# Patient Record
Sex: MALE | Race: WHITE
[De-identification: names, ages, dates, MRNs, and addresses within clinical notes are randomized per-mention and may not be internally consistent; named-entity substitution may affect disease eponyms.]

---

## 2019-07-28 ENCOUNTER — HOSPITAL ENCOUNTER (EMERGENCY)
Dept: HOSPITAL 25 - ED | Age: 24
Discharge: HOME | End: 2019-07-28
Payer: COMMERCIAL

## 2019-07-28 VITALS — SYSTOLIC BLOOD PRESSURE: 121 MMHG | DIASTOLIC BLOOD PRESSURE: 77 MMHG

## 2019-07-28 DIAGNOSIS — B27.90: Primary | ICD-10-CM

## 2019-07-28 DIAGNOSIS — J45.909: ICD-10-CM

## 2019-07-28 DIAGNOSIS — Z88.0: ICD-10-CM

## 2019-07-28 DIAGNOSIS — F17.290: ICD-10-CM

## 2019-07-28 LAB
ALBUMIN SERPL BCG-MCNC: 3.8 G/DL (ref 3.2–5.2)
ALBUMIN/GLOB SERPL: 1.2 {RATIO} (ref 1–3)
ALP SERPL-CCNC: 122 U/L (ref 34–104)
ALT SERPL W P-5'-P-CCNC: 147 U/L (ref 7–52)
ANION GAP SERPL CALC-SCNC: 6 MMOL/L (ref 2–11)
APTT PPP: 36.5 SECONDS (ref 26–38)
AST SERPL-CCNC: 134 U/L (ref 13–39)
BASOPHILS # BLD AUTO: 0 10^3/UL (ref 0–0.2)
BUN SERPL-MCNC: 10 MG/DL (ref 6–24)
BUN/CREAT SERPL: 9.7 (ref 8–20)
CALCIUM SERPL-MCNC: 9.2 MG/DL (ref 8.6–10.3)
CHLORIDE SERPL-SCNC: 100 MMOL/L (ref 101–111)
EOSINOPHIL # BLD AUTO: 0.1 10^3/UL (ref 0–0.6)
GLOBULIN SER CALC-MCNC: 3.1 G/DL (ref 2–4)
GLUCOSE SERPL-MCNC: 91 MG/DL (ref 70–100)
HCO3 SERPL-SCNC: 29 MMOL/L (ref 22–32)
HCT VFR BLD AUTO: 47 % (ref 42–52)
HGB BLD-MCNC: 16 G/DL (ref 14–18)
INR PPP/BLD: 1.12 (ref 0.82–1.09)
LYMPHOCYTES # BLD AUTO: 4.6 10^3/UL (ref 1–4.8)
MCH RBC QN AUTO: 30 PG (ref 27–31)
MCHC RBC AUTO-ENTMCNC: 34 G/DL (ref 31–36)
MCV RBC AUTO: 89 FL (ref 80–94)
MONOCYTES # BLD AUTO: 0.9 10^3/UL (ref 0–0.8)
NEUTROPHILS # BLD AUTO: 1.4 10^3/UL (ref 1.5–7.7)
NRBC # BLD AUTO: 0 10^3/UL
NRBC BLD QL AUTO: 0.3
PLATELET # BLD AUTO: 124 10^3/UL (ref 150–450)
POTASSIUM SERPL-SCNC: 4.2 MMOL/L (ref 3.5–5)
PROT SERPL-MCNC: 6.9 G/DL (ref 6.4–8.9)
RBC # BLD AUTO: 5.25 10^6 /UL (ref 4.18–5.48)
SODIUM SERPL-SCNC: 135 MMOL/L (ref 135–145)
VARIANT LYMPHS # BLD MANUAL: 49 % (ref 0–6)
WBC # BLD AUTO: 6.9 10^3/UL (ref 3.5–10.8)

## 2019-07-28 PROCEDURE — 85610 PROTHROMBIN TIME: CPT

## 2019-07-28 PROCEDURE — 85730 THROMBOPLASTIN TIME PARTIAL: CPT

## 2019-07-28 PROCEDURE — 99282 EMERGENCY DEPT VISIT SF MDM: CPT

## 2019-07-28 PROCEDURE — 83605 ASSAY OF LACTIC ACID: CPT

## 2019-07-28 PROCEDURE — 85025 COMPLETE CBC W/AUTO DIFF WBC: CPT

## 2019-07-28 PROCEDURE — 80053 COMPREHEN METABOLIC PANEL: CPT

## 2019-07-28 PROCEDURE — 86308 HETEROPHILE ANTIBODY SCREEN: CPT

## 2019-07-28 PROCEDURE — 36415 COLL VENOUS BLD VENIPUNCTURE: CPT

## 2019-07-28 PROCEDURE — 87040 BLOOD CULTURE FOR BACTERIA: CPT

## 2019-07-28 PROCEDURE — 85060 BLOOD SMEAR INTERPRETATION: CPT

## 2019-07-28 PROCEDURE — 71046 X-RAY EXAM CHEST 2 VIEWS: CPT

## 2019-07-28 NOTE — ED
HPI Febrile Illness





- HPI Summary


HPI Summary: 


This patient is a 24 year old male presenting to [facility] with a chief complaint 

of fever since 5 days ago. He reports rash, n/v, chills, and diaphoresis. He 

says the rash has been moving around to different body parts. The patient 

denies ear pain ,throat pain, and diarrhea. The patient states he has had 4-5 

tick bites over the last year but states a test for lyme came back negative 

yesterday. He states the rash is not puritic. 





- History of Current Complaint


Chief Complaint: EDFever


Time Seen by Provider: 07/28/19 11:07


Hx Obtained From: Patient


Onset/Duration: Started Days Ago


Pain Intensity: 0


Pain Scale Used: 0-10 Numeric


Associated Signs and Symptoms: Diaphoresis





- Allergy/Home Medications


Allergies/Adverse Reactions: 


 Allergies











Allergy/AdvReac Type Severity Reaction Status Date / Time


 


MS Penicillin V Allergy Intermediate Rash Verified 12/02/15 12:40





[From Penicil VK]     














PMH/Surg Hx/FS Hx/Imm Hx


Respiratory History: Reports: Hx Asthma - seasonl





- Surgical History


Surgery Procedure, Year, and Place: t & a, ear tubes as a child


Infectious Disease History: No


Infectious Disease History: 


   Denies: Hx Clostridium Difficile, Hx Hepatitis, Hx Human Immunodeficiency 

Virus (HIV), Hx of Known/Suspected MRSA, Hx Tuberculosis, Hx Known/Suspected VRE

, Hx Known/Suspected VRSA, History Other Infectious Disease, Traveled Outside 

the  in Last 30 Days





- Social History


Alcohol Use: Rare


Substance Use Type: Reports: None


Smoking Status (MU): Never Smoked Tobacco


Amount Used/How Often: daily/used daily


Have You Smoked in the Last Year:  - pt chew tobacco





Review of Systems


Positive: Fever, Chills, Skin Diaphoresis


Negative: Sore Throat, Ear Ache


Positive: Vomiting, Nausea


Positive: Rash


All Other Systems Reviewed And Are Negative: Yes





Physical Exam





- Summary


Physical Exam Summary: 





Appearance: Well appearing, no pain distress


Skin: warm, dry, reflects adequate perfusion. Rash papular erythmatous on the 

left arm. 


Head/face: normal


Eyes: EOMI, CARRIE


ENT: normal


Neck: supple, non-tender


Respiratory: CTA, breath sounds present


Cardiovascular: RRR, pulses symmetrical  


Abdomen: non-tender, soft


Musculoskeletal: normal, strength/ROM intact


Neuro: normal, sensory motor intact, A&Ox3





Triage Information Reviewed: Yes


Vital Signs On Initial Exam: 


 Initial Vitals











Temp Pulse Resp BP Pulse Ox


 


 99.6 F   95   20   118/79   97 


 


 07/28/19 10:56  07/28/19 10:56  07/28/19 10:56  07/28/19 10:56  07/28/19 10:56











Vital Signs Reviewed: Yes





Diagnostics





- Vital Signs


 Vital Signs











  Temp Pulse Resp BP Pulse Ox


 


 07/28/19 10:56  99.6 F  95  20  118/79  97














- Laboratory


Result Diagrams: 


 07/28/19 12:04





 07/28/19 12:04


Lab Statement: Any lab studies that have been ordered have been reviewed, and 

results considered in the medical decision making process.





- Radiology


  ** CXR


Radiology Interpretation Completed By: Radiologist


Summary of Radiographic Findings: No radiographic evidence of cardiopulmonary 

disease. ED Provider has reviewed this report.





Course/Dx





- Course


Course Of Treatment: This patient is a 24 year old male presenting to [facility] 

with a chief complaint of fever since 5 days ago. He reports rash, n/v, chills, 

and diaphoresis. Mononucleoisis test was positive. A plan for discharge was 

discussed with the patient and he was agreeable with this plan.





- Febrile Illness


Differential Diagnoses: Other: - fever/allergic reaction





- Diagnoses


Provider Diagnoses: 


 Infectious mononucleosis








Discharge





- Sign-Out/Discharge


Documenting (check all that apply): Patient Departure - Discharge


Patient Received Moderate/Deep Sedation with Procedure: No





- Discharge Plan


Condition: Stable


Disposition: HOME


Prescriptions: 


predniSONE TAB* [Deltasone TAB*] 50 mg PO ONCE #5 tab


Patient Education Materials:  Mononucleosis (ED)


Referrals: 


[facility] [place] PHYSICIAN REFERRAL [Outside] - 3 Days


Additional Instructions: 


Follow up with your primary care physician in 3 days. No contact sports. No 

exercise. No lifting weights.  





- Billing Disposition and Condition


Condition: STABLE


Disposition: Home





- Attestation Statements


Document Initiated by Scribe: Yes


Documenting Scribe: Fredis Tomas


Provider For Whom Kayleen is Documenting (Include Credential): Brian Estrella MD


Scribe Attestation: 


Fredis BAKER, scribed for Brian Estrella MD on 07/28/19 at 1356. 


Scribe Documentation Reviewed: Yes


Provider Attestation: 


The documentation as recorded by the Fredis swann accurately 

reflects the service I personally performed and the decisions made by me, 

Brian Estrella MD


Status of Scribe Document: Viewed